# Patient Record
Sex: MALE | Race: WHITE
[De-identification: names, ages, dates, MRNs, and addresses within clinical notes are randomized per-mention and may not be internally consistent; named-entity substitution may affect disease eponyms.]

---

## 2018-12-28 ENCOUNTER — HOSPITAL ENCOUNTER (EMERGENCY)
Dept: HOSPITAL 42 - ED | Age: 53
Discharge: HOME | End: 2018-12-28
Payer: COMMERCIAL

## 2018-12-28 VITALS — OXYGEN SATURATION: 97 % | SYSTOLIC BLOOD PRESSURE: 170 MMHG | HEART RATE: 92 BPM | DIASTOLIC BLOOD PRESSURE: 90 MMHG

## 2018-12-28 VITALS — BODY MASS INDEX: 35.5 KG/M2

## 2018-12-28 VITALS — TEMPERATURE: 98.9 F | RESPIRATION RATE: 18 BRPM

## 2018-12-28 DIAGNOSIS — I10: ICD-10-CM

## 2018-12-28 DIAGNOSIS — J32.9: Primary | ICD-10-CM

## 2018-12-28 DIAGNOSIS — F17.210: ICD-10-CM

## 2018-12-28 LAB — INFLUENZA A B: (no result)

## 2018-12-28 NOTE — ED PDOC
Arrival/HPI





- General


Chief Complaint: Flu-like Symptoms


Time Seen by Provider: 12/28/18 13:49


Historian: Patient





- History of Present Illness


Narrative History of Present Illness (Text): 





12/28/18 15:10


53-year-old male presents today with a 2 day history of nasal congestion sore 

throat occasional cough and sinus pressure with subjective fevers at home. No 

chest pain or shortness of breath. No abdominal pain. No nausea vomiting 

diarrhea or constipation. No medications have been taken for pain at home. 

Patient complaining of postnasal drip and left sided sinus pressure. Denies ear 

pain.





Past Medical History





- Provider Review


Nursing Documentation Reviewed: Yes





- Travel History


Have you recently traveled outside [place] w/in the past 3 mons?: No





- Tetanus Immunization


Tetanus Immunization: Unknown





- Cardiac


Hx Hypertension: Yes





- Pulmonary


Hx Asthma: Yes





- Gastrointestinal


Hx Hemorrhoids: Yes





- Psychiatric


Hx Substance Use: No





- Surgical History


Other/Comment: hemorrhoids





- Anesthesia


Hx Anesthesia: Yes


Hx Anesthesia Reactions: No


Hx Malignant Hyperthermia: No





- Suicidal Assessment


Feels Threatened In Home Enviroment: No





Family/Social History





- Physician Review


Nursing Documentation Reviewed: Yes


Family/Social History: Unknown Family HX


Smoking Status: Light Smoker < 10 Cigarettes Daily


Hx Alcohol Use: Yes


Frequency of alcohol use: Socially


Hx Substance Use: No





Allergies/Home Meds


Allergies/Adverse Reactions: 


Allergies





Sulfa (Sulfonamide Antibiotics) Allergy (Verified 12/28/18 13:40)


   RASH











Review of Systems





- Review of Systems


Constitutional: Fevers.  absent: Fatigue


ENT: Sore Throat, Sinus Congestion


Respiratory: Cough.  absent: SOB


Cardiovascular: absent: Chest Pain, Palpitations


Gastrointestinal: absent: Abdominal Pain, Constipation, Diarrhea, Nausea, 

Vomiting


Musculoskeletal: absent: Arthralgias


Skin: absent: Rash, Pruritis


Neurological: absent: Headache, Dizziness


Psychiatric: absent: Anxiety, Depression





Physical Exam


Vital Signs Reviewed: Yes





Vital Signs











  Temp Pulse Resp BP Pulse Ox


 


 12/28/18 13:19  98.9 F  98 H  18  170/92 H  99











Temperature: Afebrile


Blood Pressure: Hypertensive


Pulse: Regular


Respiratory Rate: Normal


Appearance: Positive for: Well-Appearing, Non-Toxic, Comfortable


Pain Distress: None


Mental Status: Positive for: Alert and Oriented X 3





- Systems Exam


Head: Present: Atraumatic, Other (+ left sided maxillary sinus ttp)


Ears: Present: Normal, NORMAL TM


Mouth: Present: Moist Mucous Membranes.  No: Drooling, Trismus


Pharnyx: Present: Normal.  No: ERYTHEMA, EXUDATE, TONSILS ENLARGED, Peritonsilar

Swelling, Uvular Deviation


Nose (External): Present: Atraumatic


Nose (Internal): Present: Normal Inspection, Clear Mucous


Neck: Present: Normal Range of Motion, Trachea Midline.  No: Lymphadenopathy


Respiratory/Chest: Present: Clear to Auscultation, Good Air Exchange.  No: 

Respiratory Distress, Accessory Muscle Use


Cardiovascular: Present: Regular Rate and Rhythm, Normal S1, S2.  No: Murmurs


Abdomen: No: Tenderness


Skin: Present: Warm, Dry


Psychiatric: Present: Alert, Oriented x 3





Medical Decision Making


ED Course and Treatment: 





12/28/18 16:02


Patient is nontoxic well-appearing in no distress. Vital signs are stable. 





motrin


augmentin po 





Rapid flu negative


Rapid strep negative





I advised follow up with primary care physician within the next 2 days. advised 

f/u with ENT specialist within the next 2 days.  I advised increase fluids and 

return if symptoms worsen persist or if new symptoms develop.





Advised patient of elevated blood pressure and need for follow-up with the PMD.





Patient verbalizes understanding of discharge instructions and need for 

immediate followup.





all aspects of this case were discussed the attending of record. 








IMPRESSION; sinusitis


Motrin one tablet every 6 hours as needed for pain/fever reduction


Augmentin 1 tablet twice daily 10 days


FLonase; 2 sprays each nostril once daily. 


Increase fluids


Followup with primary care physician the next 2 days


Follow-up with the ENT specialist within the next 2 days


Return if symptoms worsen persist or if new symptoms develop











Disposition/Present on Arrival





- Present on Arrival


Any Indicators Present on Arrival: No


History of DVT/PE: No


History of Uncontrolled Diabetes: No


Urinary Catheter: No


History of Decub. Ulcer: No


History Surgical Site Infection Following: None





- Disposition


Have Diagnosis and Disposition been Completed?: Yes


Diagnosis: 


 Sinusitis





Disposition: HOME/ ROUTINE


Disposition Time: 15:30


Patient Plan: Discharge


Patient Problems: 


                             Current Active Problems











Problem Status Onset


 


Sinusitis Acute 











Condition: GOOD


Discharge Instructions (ExitCare):  Sinusitis, Adult (DC)


Additional Instructions: 


Motrin one tablet every 6 hours as needed for pain/fever reduction


Augmentin 1 tablet twice daily 10 days


FLonase; 2 sprays each nostril once daily. 


Increase fluids


Followup with primary care physician the next 2 days


Follow-up with the ENT specialist within the next 2 days


Return if symptoms worsen persist or if new symptoms develop


Prescriptions: 


Amoxicillin/Clavulanate [Augmentin 875 MG-125 MG] 1 tab PO BID #20 tab


Fluticasone Nasal [Flonase] 2 spr NS DAILY #1 spr


Ibuprofen [Motrin] 600 mg PO Q6H PRN #20 tab


 PRN Reason: pain/fever reduction


Referrals: 


Efra Dean DO [Staff Provider] - Follow up with primary


Adrienne Duque MD [Medical Doctor] - Follow up with primary


 Service [Outside] - Follow up with primary


Forms:  [facility] (English), WORK NOTE